# Patient Record
Sex: MALE | Race: BLACK OR AFRICAN AMERICAN | NOT HISPANIC OR LATINO | URBAN - METROPOLITAN AREA
[De-identification: names, ages, dates, MRNs, and addresses within clinical notes are randomized per-mention and may not be internally consistent; named-entity substitution may affect disease eponyms.]

---

## 2017-12-22 ENCOUNTER — APPOINTMENT (RX ONLY)
Dept: URBAN - METROPOLITAN AREA CLINIC 368 | Facility: CLINIC | Age: 37
Setting detail: DERMATOLOGY
End: 2017-12-22

## 2017-12-22 DIAGNOSIS — D18.0 HEMANGIOMA: ICD-10-CM

## 2017-12-22 DIAGNOSIS — L72.0 EPIDERMAL CYST: ICD-10-CM

## 2017-12-22 PROBLEM — D48.5 NEOPLASM OF UNCERTAIN BEHAVIOR OF SKIN: Status: ACTIVE | Noted: 2017-12-22

## 2017-12-22 PROBLEM — D18.01 HEMANGIOMA OF SKIN AND SUBCUTANEOUS TISSUE: Status: ACTIVE | Noted: 2017-12-22

## 2017-12-22 PROBLEM — D23.5 OTHER BENIGN NEOPLASM OF SKIN OF TRUNK: Status: ACTIVE | Noted: 2017-12-22

## 2017-12-22 PROCEDURE — 99202 OFFICE O/P NEW SF 15 MIN: CPT

## 2017-12-22 PROCEDURE — ? COUNSELING

## 2017-12-22 ASSESSMENT — LOCATION DETAILED DESCRIPTION DERM
LOCATION DETAILED: PERIUMBILICAL SKIN
LOCATION DETAILED: PERIUMBILICAL SKIN

## 2017-12-22 ASSESSMENT — LOCATION ZONE DERM
LOCATION ZONE: TRUNK
LOCATION ZONE: TRUNK

## 2017-12-22 ASSESSMENT — LOCATION SIMPLE DESCRIPTION DERM
LOCATION SIMPLE: ABDOMEN
LOCATION SIMPLE: ABDOMEN

## 2022-05-06 NOTE — HPI: SKIN LESION
"Encounter Date: 6/9/2020       History     Chief Complaint   Patient presents with    Fever     FEVER SINCE 1400 TODAY, CONFUSION STARTING TODAY AFTER FEVER     Chief complaint is weakness mild confusion and fever.  The patient became ill today and the wife noted he had a fever.  In the past he has been septic from an extremity infection and this is a similar presentation.  The patient denies any earache.  He has a mild sore throat.  He denies any productive cough.  He has no nausea vomiting.  He has had mild diarrhea recently.  He denies any urinary tract symptoms.  He states he had a rash and  chafing to the groin area.        Review of patient's allergies indicates:   Allergen Reactions    Vasopressin Anaphylaxis and Other (See Comments)     Increased pressure in his head; felt like his eyeballs and veins were going to pop out of his head.     Heparin Other (See Comments)     Other reaction(s): "depleted my blood platets"    Decreased platelet count    Heparin analogues Other (See Comments)     Depleted WBC count    Vasopressin analogues Other (See Comments)     "felt like eyes going to pop out of my head"    Morphine Hallucinations, Other (See Comments) and Anxiety     Other reaction(s): Hallucinations  Paranoid, hallucinations       Past Medical History:   Diagnosis Date    AICD (automatic cardioverter/defibrillator) present     Anxiety and depression 2012    CAD (coronary artery disease) 2012    Cardiomegaly 2016    CHF (congestive heart failure) 2012    Cholecystitis 2017    Complete heart block 2012    Diabetic foot ulcer     DVT (deep venous thrombosis) 2012    And pulmonary embolus    GERD (gastroesophageal reflux disease) 2010    Heparin induced thrombocytopenia     Hyperlipemia 2011    IDDM (insulin dependent diabetes mellitus) 1983    With neuropathy    Ischemic cardiomyopathy 2012    MI (myocardial infarction) 2012    Morbid obesity     MRSA (methicillin resistant Staphylococcus "
How Severe Is Your Skin Lesion?: moderate
Has Your Skin Lesion Been Treated?: not been treated
aureus) infection 2019    Noncompliance with therapeutic plan     Osteomyelitis of right foot 2019    Pulmonary edema 2019    Respiratory failure     Sepsis 2019    Due to cellulitis right leg    Sleep apnea 2013    Thrombocytopathy     Venous stasis dermatitis of both lower extremities      Past Surgical History:   Procedure Laterality Date    CARDIAC PACEMAKER PLACEMENT  2012    CARDIAC PACEMAKER PLACEMENT  10/04/2018    battery replacement    CORONARY ARTERY BYPASS GRAFT  2012    ESOPHAGOGASTRODUODENOSCOPY  2017    SAMARA FILTER PLACEMENT  2012    vena cava    LUMBAR SYMPATHETIC NERVE BLOCK Right 2019    Procedure: BLOCK, NERVE, SYMPATHETIC, LUMBAR;  Surgeon: Tashi Good MD;  Location: [facility];  Service: Pain Management;  Laterality: Right;  RIGHT SYMPATHETIC NERVE BLOCK     Family History   Problem Relation Age of Onset    Arthritis Mother     Diabetes Mother     Cancer Father     Heart disease Father     Stroke Father      Social History     Tobacco Use    Smoking status: Former Smoker     Packs/day: 2.00     Years: 38.00     Pack years: 76.00     Types: Cigarettes     Last attempt to quit:      Years since quittin.4    Smokeless tobacco: Never Used   Substance Use Topics    Alcohol use: No     Frequency: Never    Drug use: Not on file     Review of Systems   Constitutional: Positive for fever. Negative for chills.   HENT: Negative for ear pain, rhinorrhea and sore throat.    Eyes: Negative for pain and visual disturbance.   Respiratory: Negative for cough and shortness of breath.    Cardiovascular: Negative for chest pain and palpitations.   Gastrointestinal: Negative for abdominal pain, constipation, diarrhea, nausea and vomiting.   Genitourinary: Negative for dysuria, frequency, hematuria and urgency.   Musculoskeletal: Negative for back pain, joint swelling and myalgias.   Skin: Negative for rash.        Patient complains of chafing 
Is This A New Presentation, Or A Follow-Up?: Skin Lesions
to the groin area   Neurological: Negative for dizziness, seizures, weakness and headaches.   Psychiatric/Behavioral: Negative for dysphoric mood. The patient is not nervous/anxious.        Physical Exam     Initial Vitals [06/09/20 2036]   BP Pulse Resp Temp SpO2   (!) 123/56 (!) 125 (!) 22 (!) 102.9 °F (39.4 °C) (!) 92 %      MAP       --         Physical Exam    Nursing note and vitals reviewed.  Constitutional: He appears well-developed and well-nourished.   HENT:   Head: Normocephalic and atraumatic.   Eyes: Conjunctivae, EOM and lids are normal. Pupils are equal, round, and reactive to light.   Neck: Trachea normal. Neck supple. No thyroid mass present.   Cardiovascular: Normal rate, regular rhythm and normal heart sounds.   Pulmonary/Chest: Breath sounds normal. No respiratory distress.   Abdominal: Soft. Bowel sounds are normal. There is no tenderness.   Musculoskeletal: Normal range of motion.   Neurological: He is alert and oriented to person, place, and time. He has normal strength and normal reflexes. No cranial nerve deficit or sensory deficit.   Skin: Skin is warm and dry.   Irritated skin in the right groin area approximately 6 in x 3 in in size.  Redness noted it is painful for the patient.  No definite purulence noted.  No spread to the associated right thigh are genitalia area.  Penis scrotal area normal.  Perianal area normal.  The patient has a bandage to the right volar foot where he has a chronic he healing wound.  No warmth no drainage no redness noted to the wound.   Psychiatric: He has a normal mood and affect. His speech is normal and behavior is normal. Judgment and thought content normal.         ED Course   Procedures  Labs Reviewed   CULTURE, BLOOD   CULTURE, BLOOD   CBC W/ AUTO DIFFERENTIAL   COMPREHENSIVE METABOLIC PANEL   LACTIC ACID, PLASMA   URINALYSIS, REFLEX TO URINE CULTURE   SARS-COV-2 RNA AMPLIFICATION, QUAL          Imaging Results    None          Medical Decision Making: 
  Initial Assessment:   Fever weakness  Differential Diagnosis:   Differential diagnosis includes infection from any source of the body.  He may have COVID versus otitis pharyngitis pneumonia intra-abdominal infection bladder infection prostate infections skin infection at [place]  ED Management:  The patient has workup and exam that shows irritation inflammation redness to the right groin.  White count is elevated.  Patient was febrile.  Blood cultures urine cultures obtained.  Hospitalist consulted at 11:25 p.m. and the patient will be admitted.                                 Clinical Impression:       ICD-10-CM ICD-9-CM   1. Weakness R53.1 780.79   2. Sepsis A41.9 038.9     995.91                                Reuben Myles MD  06/09/20 2340    
Attending Attestation (For Attendings USE Only)...